# Patient Record
Sex: MALE | Race: WHITE
[De-identification: names, ages, dates, MRNs, and addresses within clinical notes are randomized per-mention and may not be internally consistent; named-entity substitution may affect disease eponyms.]

---

## 2021-01-11 NOTE — NUR
Both nares swabbed for Covid-19 without complication.
Sample taken to [facility].
Patient given pre-procedure preparation self-isolation handout.

## 2021-01-14 ENCOUNTER — HOSPITAL ENCOUNTER (OUTPATIENT)
Dept: HOSPITAL 46 - OPS | Age: 61
Discharge: HOME | End: 2021-01-14
Attending: SURGERY
Payer: COMMERCIAL

## 2021-01-14 VITALS — HEIGHT: 72 IN | BODY MASS INDEX: 35.03 KG/M2 | WEIGHT: 258.6 LBS

## 2021-01-14 DIAGNOSIS — Z79.899: ICD-10-CM

## 2021-01-14 DIAGNOSIS — Z79.4: ICD-10-CM

## 2021-01-14 DIAGNOSIS — K21.9: ICD-10-CM

## 2021-01-14 DIAGNOSIS — F32.9: ICD-10-CM

## 2021-01-14 DIAGNOSIS — K63.89: ICD-10-CM

## 2021-01-14 DIAGNOSIS — D12.0: Primary | ICD-10-CM

## 2021-01-14 DIAGNOSIS — I10: ICD-10-CM

## 2021-01-14 DIAGNOSIS — E78.5: ICD-10-CM

## 2021-01-14 DIAGNOSIS — K64.8: ICD-10-CM

## 2021-01-14 DIAGNOSIS — E11.42: ICD-10-CM

## 2021-01-14 DIAGNOSIS — Z83.71: ICD-10-CM

## 2021-01-14 PROCEDURE — 3E0H8KZ INTRODUCTION OF OTHER DIAGNOSTIC SUBSTANCE INTO LOWER GI, VIA NATURAL OR ARTIFICIAL OPENING ENDOSCOPIC: ICD-10-PCS | Performed by: SURGERY

## 2021-01-14 PROCEDURE — 0DBH8ZZ EXCISION OF CECUM, VIA NATURAL OR ARTIFICIAL OPENING ENDOSCOPIC: ICD-10-PCS | Performed by: SURGERY

## 2021-01-14 PROCEDURE — G0500 MOD SEDAT ENDO SERVICE >5YRS: HCPCS

## 2021-01-14 PROCEDURE — 0DBE8ZZ EXCISION OF LARGE INTESTINE, VIA NATURAL OR ARTIFICIAL OPENING ENDOSCOPIC: ICD-10-PCS | Performed by: SURGERY

## 2021-01-14 NOTE — OR
[facility]
                                    [address], [place]  [address]
_________________________________________________________________________________________
                                                                 Signed   
 
 
DATE OF OPERATION:
01/14/2021
 
SURGEON:
Noah Luna MD
 
PREOPERATIVE DIAGNOSES:
1. Personal history of colonic polyps.
2. Tubulovillous adenomatous polyp on the ileocecal valve.
 
POSTOPERATIVE DIAGNOSES:
1. 8 mm polyp at the ileocecal valve (tattoo).
2. 6 mm polyp at 8 cm.
3. 6 mm polyp at 72 cm.
4. Minimal internal hemorrhoids.
5. Tortuous sigmoid colon.
 
PROCEDURES:
Colonoscopy, snare polypectomy, hot biopsy and injection of tattoo.
 
ESTIMATED BLOOD LOSS:
None.
 
INDICATIONS:
Benedict is a 60-year-old diabetic gentleman, who came to us a few months ago for his
initial colonoscopy.  He said his sister has had previous colonoscopies, but he does not
know much detail.  Benedict himself had hyperplastic and adenomatous polyps removed.  He
had a significant sessile polyp on the ileocecal valve; much of that polyp had been
removed.  The tissue came back tubulovillous adenomatous material.  No dysplasia or
cancer.  We asked him to come back for repeat colonoscopy to reassess that area.  He
understands colonoscopy quite well.  He understands our bowel prep quite well.  He also
had done well with Versed and fentanyl.  He had expressed understanding and wished to
proceed. 
 
DESCRIPTION OF PROCEDURE:
Benedict was taken into our endoscopy suite and placed in the left lateral decubitus
position.  He was given a total of 8 mg of Versed and 150 mcg of fentanyl to cover the
case.  A digital rectal exam was performed and this was unremarkable.  The adult
colonoscope was introduced and advanced under direct visualization of camera.  He does
have a tortuous sigmoid colon; it took a few extra minutes to get through that area.
His prep was slightly below average unfortunately; his first prep was much better;
however, we could clearly see 80% to 85% at least of the mucosa.  The scope then
 
    Electronically Signed By: NOAH LUNA MD  01/14/21 1045
_________________________________________________________________________________________
PATIENT NAME:     BENEDICT WERNER                
MEDICAL RECORD #: [id number]            OPERATIVE REPORT              
          ACCT #: [id number]  
DATE OF BIRTH:   [birth year]            REPORT #: [id number]      
PHYSICIAN:        NOAH LUNA MD             
PCP:              CECILIA BOYER             
REPORT IS CONFIDENTIAL AND NOT TO BE RELEASED WITHOUT AUTHORIZATION
 
 
                                  [facility]
                                    [address], [place]  [address]
_________________________________________________________________________________________
                                                                 Signed   
 
 
advanced quite readily all up to the hepatic flexure; it took just a minute to get
around hepatic flexure and down into the cecum itself.  We could easily see the small
polyp on the edge of the ileal cecal valve; it seemed to extend just a bit down on the
edge as well.  We used the snare and hot biopsy forceps, removed the main portion that
polyp.  We then used cautery along that edge to cauterize that mucosa.  We then placed a
tattoo on either side of the polyp area to roseline its location for future reference.
After this, the scope was then slowly withdrawn.  We had taken pictures throughout for
photodocumentation.  We found two other small polyps removed easily with hot biopsy
forceps.  Again, we came back to the tortuous sigmoid colon.  Once in the rectum, the
scope was retroflexed.  He does have very minimal internal hemorrhoid tissue.  After
this, the gas was suctioned out.  The colonoscope was removed.  Benedict tolerated the
procedure quite well. 
 
RECOMMENDATIONS:
I will see Benedict back in my office in 7 to 14 days to review his results.  He should
consider repeat colonoscopy somewhere between 6 and 12 months.  He probably should use a
double bowel prep in the future. 
 
 
 
            ________________________________________
            Noah Luna MD 
 
 
ALB/MODL
Job #:  [id number]
DD:  01/14/2021 08:24:21
DT:  01/14/2021 10:05:53
 
cc:            Cecilia Luna MD
 
 
Copies:  CECILIA BOYER ANDREW L MD
~
 
 
 
 
 
 
    Electronically Signed By: NOAH LUNA MD  01/14/21 1045
_________________________________________________________________________________________
PATIENT NAME:     BENEDICT WERENR                
MEDICAL RECORD #: [id number]            OPERATIVE REPORT              
          ACCT #: [id number]  
DATE OF BIRTH:   [birth year]            REPORT #: [id number]      
PHYSICIAN:        NOAH LUNA MD             
PCP:              CECILIA BOYER             
REPORT IS CONFIDENTIAL AND NOT TO BE RELEASED WITHOUT AUTHORIZATION

## 2021-01-14 NOTE — NUR
01/14/21 0841 Pro Meek
RESPONDS TO VOICE ON ENTRY TO PACU, DENIES NAUSEA OR PAIN. FALLS
ASLEEP EASILY.
REPOSITIONED IN BED AT 0830 AND SIPS OF WATER TAKEN WITHOUT PROBLEMS.
REFUSES OFFER OF CRACKERS. COFFEE PROVIDED.  MG/DL ON BEDSIDE
FINGERSTICK.
REVIEWING DISCHARGE INSTRUCTIONS WITH PATIENT AT BEDSIDE WHILE PT SIPS
COFFEE.

## 2021-01-18 NOTE — PATH
[facility]
                                    [address], [place]  [address]
_________________________________________________________________________________________
                                                                 Signed   
 
 
 
SPECIMEN(S): A ILEOCECAL VALVE POLYP
SPECIMEN(S): B COLON POLYP AT 80 CM
SPECIMEN(S): C COLON POLYP AT 72 CM
 
SPECIMEN SOURCE:
A. ILEOCECAL VALVE POLYP
B. COLON POLYP AT 80 CM
C. COLON POLYP AT 72 CM
 
CLINICAL HISTORY:
History of polyps.
MICROSCOPIC DESCRIPTION:
Histologic sections of all submitted blocks are examined by light microscopy. 
These findings, together with the gross examination, support the pathologic 
diagnosis. 
 
FINAL PATHOLOGIC DIAGNOSIS:
A.  Ileocecal valve polyp, polypectomy:
-  Fragment of tubular adenoma.
-  Negative for high-grade dysplasia and malignancy.
B.  Colon polyp at 80 cm, polypectomy:
-  Fragments of tubular adenoma.
-  Negative for high-grade dysplasia and malignancy.
C.  Colon polyp at 72 cm, polypectomy:
-  Fragments of tubular adenoma.
-  Negative for high-grade dysplasia and malignancy.
DF:bg:C2NR
 
GROSS DESCRIPTION:
Three specimens are received in three containers, labeled "Benedict Rogers."
A.  The specimen, labeled "Sue Benedict, 1," and designated on the 
requisition "ileocecal valve polyp," is received in formalin and consists of 
four tan soft tissue fragment(s) that measure 
0.2-0.3 cm in greatest dimension. The specimen is entirely submitted in 
cassette (A1). 
B.  The specimen, labeled "Sue Benedict, #2," and designated on the 
requisition "colon polyp at 80 cm," is received in formalin and consists of one 
tan soft tissue fragment(s) that measure 0.3 cm 
in greatest dimension. The specimen is entirely submitted in cassette (B1).
C.  The specimen, labeled "Benedict Rogers, #3," and designated on the 
requisition "colon polyp at 72 cm," is received in formalin and consists of two 
 
                                                                                    
_________________________________________________________________________________________
PATIENT NAME:     BENEDICT ROGERS                
MEDICAL RECORD #: [id number]            PATHOLOGY                     
          ACCT #: [id number]       ACCESSION #: [id number]     
DATE OF BIRTH:   [birth year]            REPORT #: [id number]       
PHYSICIAN:        GAVIN PATHOLOGY              
PCP:              TRAMAINE BOYER             
REPORT IS CONFIDENTIAL AND NOT TO BE RELEASED WITHOUT AUTHORIZATION
 
 
                                  [facility]
                                    [address], [place]  [address]
_________________________________________________________________________________________
                                                                 Signed   
 
 
tan soft tissue fragment(s) that measure 0.3 and 
0.4 cm in greatest dimension. The specimen is entirely submitted in cassette 
(C1). 
FB (under the direct supervision of a pathologist)
 
The Gross Description was prepared using a voice recognition system. The report 
was reviewed for accuracy; however, sound-alike word errors, addition and/or 
deletions may occur. If there is any 
question about this report, please contact Client Services.
 
PERFORMING LABORATORY:
The technical component was performed by [facility], [address] [place] [address] (Medical Director: Steph Nash MD; CLIA# [id number]). 
Professional interpretation was performed by 
[facility], [address] [place] [address] (Medical Director: Steph Nash MD; CLIA# [id number]). 
 
Diagnostician:  Luis Angel Jerez DO
Pathologist
Electronically Signed 01/18/2021
 
 
Copies:                                
~
 
 
 
 
 
 
 
 
 
 
 
 
 
 
 
 
 
 
 
                                                                                    
_________________________________________________________________________________________
PATIENT NAME:     BENEDICT ROGERS                
MEDICAL RECORD #: [id number]            PATHOLOGY                     
          ACCT #: [id number]       ACCESSION #: [id number]     
DATE OF BIRTH:   [birth year]            REPORT #: [id number]       
PHYSICIAN:        GAVIN PATHOLOGY              
PCP:              TRAMAINE BOYER             
REPORT IS CONFIDENTIAL AND NOT TO BE RELEASED WITHOUT AUTHORIZATION

## 2024-08-21 NOTE — NUR
08/21/24 0856 Pricila Champion
0850: OXYGEN SATURATION REMAINS 100% ON 4L VIA NC. OXYGEN REDUCED TO
3L VIA NC.
 
0855: OXYGEN SATURATION REMAINS 100% 3L VIA NC. OXYGEN REDUCED TO 2L
VIA NC.

## 2024-08-22 NOTE — OR
[facility]
                                    [address], [place]  [address]
_________________________________________________________________________________________
                                                                 Signed   
 
 
DATE OF OPERATION:
08/21/2024
 
SURGEON:
Noah Lara MD
 
PREOPERATIVE DIAGNOSES:
1. Gastroesophageal reflux disease.
2. Burping.
3. Irritable bowel syndrome with constipation.
4. Sister with a history of colonic polyps in her 50s.
5. Personal history of colonic polyps in 2020 at age 60.
6. Tubulovillous adenomatous polyp on the ileocecal valve/cecum with tattoo in 2020 and
2021. 
7. Internal hemorrhoids.
8. Tortuous sigmoid colon (35 cm).
 
POSTOPERATIVE DIAGNOSES:
1. Mild gastroduodenitis.
2. A 15 mm sessile polyp on the ileocecal valve (tattoo, snare).
3. Tortuous sigmoid colon (35 cm).
 
PROCEDURES:
1. Esophagogastroduodenoscopy with CLOtest and biopsies of the pyloric bulb and antrum.
2. Colonoscopy snare polypectomy and cautery with a hot biopsy forceps.
 
INDICATIONS:
Benedict is a 63-year-old obese diabetic gentleman, asked to see me for a followup
colonoscopy.  In addition, he has been having trouble with acid reflux and burping.  He
has been on omeprazole.  Consequently, his primary care provider also wanted him to have
an upper endoscopy at the same time.  He has a history of irritable bowel syndrome and
maybe even some constipation.  We also know his sister had colonic polyps in her late
50s.  His sister continues to do well.  I helped Benedict with a colonoscopy in 2020 at
the age of 60.  This was his initial colonoscopy.  He had a fairly significant sessile
tubulovillous adenomatous polyp on the cecum edge of the ileocecal valve.  He had
several other adenomatous polyps in the colon and hyperplastic polyp in the rectum.  He
had mild internal hemorrhoids.  He also has a very difficult tortuous sigmoid colon at
about 35 cm.  He has large amounts of Versed and fentanyl to get through the case.  We
brought him back in January 2021 and repeated the colonoscopy.  We removed the rest of
the polyp on the ileocecal valve, which came back adenomatous tissue.  We also left a
tattoo in this area.  He always has a tortuous sigmoid colon.  He had two other adenomas
polyps as well.  Again, used large amount of Versed and fentanyl.  He also has his
 
    Electronically Signed By: NOAH LARA MD  08/22/24 0627
_________________________________________________________________________________________
PATIENT NAME:     BENEDICT WERNER                
MEDICAL RECORD #: [id number]            OPERATIVE REPORT              
          ACCT #: [id number]  
DATE OF BIRTH:   [birth year]            REPORT #: [id number]      
PHYSICIAN:        NOAH LARA MD             
PCP:              CECILIA BOEYR             
REPORT IS CONFIDENTIAL AND NOT TO BE RELEASED WITHOUT AUTHORIZATION
 
 
                                  [facility]
                                    [address], [place]  [address]
_________________________________________________________________________________________
                                                                 Signed   
 
 
internal hemorrhoids.  We have recommended that he come back on this occasion for
followup colonoscopy.  We also recommend he use a double bowel prep.  He told me he made
it through about 3/4 of a gallon of the polyethylene glycol.  It __________ another 32
ounces.  He did work as a nurse assistant for 12 years, so he is very familiar with
polyethylene glycol along with Dulcolax tablets.  He is very familiar with upper and
lower endoscopy.  He is aware there are risk including, but not limited to gas bloating,
crampy abdominal pain, bleeding, perforation requiring surgery, and missed diagnosis.
We had reviewed the written instructions for the bowel prep again line by line.  He did
volunteer to use a double prep as we recommended.  He seems to be fairly sensitive and
said he started to vomit.  We may have to change his prep in the future.  We had asked
him to actually hold his Trulicity one week prior to the procedure.  He did not do that.
 He did hold the diclofenac.  He said he does not take his diabetic pills till noon, so
he will have his colonoscopy and upper endoscopy completed in the morning prior to this.
 He also has a very full around face.  He is obese with a heavy neck, chest and abdomen
along with his diabetes.  In addition, he uses a very large amount of Versed and
fentanyl.  We felt it was much safer on this occasion.  He was given monitored
anesthesia care, propofol infusion.  That turned out to be wise as he also has a very
large tongue and he obstructs quite easily.  He understands an adult person has to take
him afterwards.  He also received IV Ancef for the metal in his left knee and ankle.  He
had expressed understanding and wished to proceed. 
 
PROCEDURE IN DETAIL:
Benedict was taken into our endoscopy suite and placed in a supine semi-recumbent
position.  He was given monitored anesthesia care propofol infusion per our nurse
anesthetist.  We utilized a bite block for the upper endoscopy.  The adult gastroscope
was introduced and advanced out into the third portion of the duodenum under direct
visualization of camera without difficulty.  The duodenum was unremarkable.  He had some
mild patchy inflammation in the pyloric bulb and stomach.  We went ahead and took a
biopsy of the pyloric bulb and the antrum for pathologic review.  We took an additional
biopsy from the antrum for CLOtest.  There were no ulcerations.  Upon retroflexion of
scope we could not appreciate a hiatal hernia.  The scope was withdrawn up through the
area of the GE junction, which was compliant without stricture.  There was no gastric or
esophageal varices.  Very little if any disruption to the Z-line.  There was no
Shearer's mucosa.  There was no esophagitis.  The middle and upper esophagus was
unremarkable.  After this, the gas was suctioned out, the gastroscope removed.  Benedict
tolerated the procedure quite well. 
 
Benedict was rotated into the left lateral decubitus position.  He was maintained on IV
propofol per our nurse anesthetist.  He needed frequent airway monitoring per the nurse
anesthetist.  A digital rectal exam was performed.  He had good sphincter tone.  No
external hemorrhoids.  No masses.  The adult colonoscope was introduced and advanced
under direct visualization of camera.  He clearly has a tortuous, difficult sigmoid
 
    Electronically Signed By: NOAH LARA MD  08/22/24 0627
_________________________________________________________________________________________
PATIENT NAME:     BENEDICT WERNER                
MEDICAL RECORD #: [id number]            OPERATIVE REPORT              
          ACCT #: [id number]  
DATE OF BIRTH:   [birth year]            REPORT #: [id number]      
PHYSICIAN:        NOAH LARA MD             
PCP:              CECILIA BOYER             
REPORT IS CONFIDENTIAL AND NOT TO BE RELEASED WITHOUT AUTHORIZATION
 
 
                                  [facility]
                                    [address], [place]  [address]
_________________________________________________________________________________________
                                                                 Signed   
 
 
colon  particular up around 35 cm.  It took a few minutes.  We finally got
through this area.  We made it around into the distal right colon.  The scope continued
to buckle even with two people applying abdominal pressure.  We therefore rotated him
into the supine position.  Again, with some abdominal compression we finally got the
scope to drop down into the cecum itself.  Unfortunately, his prep was moderate.  He
still had quite a bit of corn in his cecum.  He also had some in the right colon.  We
could see that the polyp had returned.  There was a tattoo on other side.  It is right
on the edge of the ileocecal valve.  We took almost all that polyp off with our snare
and suctioned it through the scope.  On the back of the fold next to the corn, we were
having trouble with one piece of polyp remaining.  It is probably 8 mm in diameter.
Eventually, we were getting a little bleeding from going a little deep with the snare.
We cauterized that bleeding with our hot biopsy forceps.  We just simply laid it on top
and that controlled the bleeding quite nicely.  We decided we are going to bring him
back with a different bowel prep and go after his remaining polyp tissue.  There was a
polyp in his mid right colon, but again this area was covered in corn at this point and
we could not find it.  Otherwise, the rest of the colon was fine.  No diverticulosis.
The rectum was unremarkable.  He had enough corn in the rectum, we could retroflex the
scope and see his anal canal.  Consequently, the gas was suctioned out.  The colonoscope
removed.  Overall, Benedict tolerated the procedure quite well. 
 
RECOMMENDATIONS:
I will see Benedict back in my office in 7 to 14 days to review his results.  He will
need a colonoscopy somewhere at the end of three months out to about 12 months.  We need
to go back and look at the ileocecal valve and finish the polypectomy.  He needs a full
commercial bowel prep.  He will always need monitored anesthesia care as well. 
 
 
 
            ________________________________________
            MD RHIANNA Nash/DAVIDAL
Job #:  [id number]
DD:  08/21/2024 09:08:14
DT:  08/21/2024 10:08:21
 
cc:            Cecilia Lara MD
 
 
 
    Electronically Signed By: NOAH LARA MD  08/22/24 0627
_________________________________________________________________________________________
PATIENT NAME:     BENEDICT WERNER                
MEDICAL RECORD #: [id number]            OPERATIVE REPORT              
          ACCT #: [id number]  
DATE OF BIRTH:   [birth year]            REPORT #: [id number]      
PHYSICIAN:        NOAH LARA MD             
PCP:              CECILIA BOYER             
REPORT IS CONFIDENTIAL AND NOT TO BE RELEASED WITHOUT AUTHORIZATION
 
 
                                  [address]
                                  [place], [place]  [address]
_________________________________________________________________________________________
                                                                 Signed   
 
 
Copies:  CECILIA BOYER ANDREW L MD
~
 
 
 
 
 
 
 
 
 
 
 
 
 
 
 
 
 
 
 
 
 
 
 
 
 
 
 
 
 
 
 
 
 
 
 
 
 
 
 
 
    Electronically Signed By: NOAH LARA MD  08/22/24 0627
_________________________________________________________________________________________
PATIENT NAME:     BENEDICT WERNER                
MEDICAL RECORD #: [id number]            OPERATIVE REPORT              
          ACCT #: [id number]  
DATE OF BIRTH:   [birth year]            REPORT #: [id number]      
PHYSICIAN:        NOAH LARA MD             
PCP:              CECILIA BOYER             
REPORT IS CONFIDENTIAL AND NOT TO BE RELEASED WITHOUT AUTHORIZATION

## 2024-08-23 NOTE — PATH
[facility]
                                    [address], [place]  [address]
_________________________________________________________________________________________
                                                                 Signed   
 
 
 
SPECIMEN(S): A DUODENAL BIOPSY
SPECIMEN(S): B ANTRUM/PYLORUS BIOPSY
SPECIMEN(S): C CECUM COLON POLYP
 
SPECIMEN SOURCE:
A. DUODENAL BIOPSY
B. ANTRUM/PYLORUS BIOPSY
C. CECUM COLON POLYP
 
CLINICAL HISTORY:
History of polyps.  Postop: Gastroduodenitis, cecal polyp, tortuous sigmoid 
colon 
 
FINAL PATHOLOGIC DIAGNOSIS:
A. Duodenum, biopsy:
-  Duodenal mucosa with no significant pathologic changes
B. Stomach, antrum/pylorus, biopsy:
-  Gastric antral mucosa with no significant pathologic changes
-  Negative for Helicobacter pylori with HE stains
C. Cecum, polypectomy:
-  Sessile serrated polyp with low-grade adenomatous dysplasia
BRP
 
MICROSCOPIC EXAMINATION:
Histologic sections of all submitted blocks are examined by light microscopy.  
These findings, together with the gross examination, support the pathologic 
diagnosis. 
 
GROSS DESCRIPTION:
A. The specimen, labeled and designated "Frovarp, designated per requisition, 
duodenum biopsy," is received in formalin and consists of one fragment of soft 
tan tissue that is up to 0.3 cm in greatest 
dimension. Entirely submitted in (A1).
B. The specimen, labeled and designated "Frovarp, designated per requisition, 
antrum/pylorus biopsy," is received in formalin and consists of one fragment of 
soft tan tissue that is up to 0.3 cm in 
greatest dimension. Entirely submitted in (B1).
C. The specimen, labeled and designated "Frovarp, designated per requisition, 
cecum polyp," is received in formalin and consists of multiple fragments of 
soft tan tissue that are up to 2.5 x 1.5 x 0.3 
cm in greatest dimension. Entirely submitted in (C1).
 
                                                                                    
_________________________________________________________________________________________
PATIENT NAME:     YARY WERNER                
MEDICAL RECORD #: [id number]            PATHOLOGY                     
          ACCT #: [id number]       ACCESSION #: [id number]     
DATE OF BIRTH:   [birth year]            REPORT #: [id number]       
PHYSICIAN:        GAVIN ANDRADE              
PCP:              TRAMAINE BOYER PAC         
REPORT IS CONFIDENTIAL AND NOT TO BE RELEASED WITHOUT AUTHORIZATION
 
 
                                  [facility]
                                    [address], [place]  [address]
_________________________________________________________________________________________
                                                                 Signed   
 
 
TW  (under the direct supervision of a pathologist)
 
The Gross Description was prepared using a voice recognition system. The report 
was reviewed for accuracy; however, sound-alike word errors, addition and/or 
deletions may occur. If there is any 
question about this report, please contact Client Services.
 
ADDITIONAL NOTES:
Immunohistochemical and/or in situ hybridization studies if performed in this 
case included appropriate positive controls that reacted as expected.  This 
test was developed and its performance 
characteristics determined by [facility].  It has not been cleared or 
approved by the U.S. Food and Drug Administration.  The FDA has determined that 
such clearance or approval is not 
necessary.  This test is used for clinical purposes.  It should not be regarded 
as investigational or for research.  [facility] is certified under the 
Clinical Laboratory Improvement 
Amendments of 1988 (CLIA) as qualified to perform high complexity clinical 
laboratory testing. 
 
PERFORMING LABORATORY:
Technical component was performed by [facility], [address] [place] [id number] (CLIA# [id number]). Professional interpretation was 
performed by [facility], 
[address] [address] (CLIA# [id number]).
 
Diagnostician:  Everton Almaraz MD
Pathologist
Electronically Signed 08/23/2024
 
 
Copies:                                
~
 
 
 
 
 
 
 
 
 
 
                                                                                    
_________________________________________________________________________________________
PATIENT NAME:     YARY WERNER                
MEDICAL RECORD #: [id number]            PATHOLOGY                     
          ACCT #: [id number]       ACCESSION #: [id number]     
DATE OF BIRTH:   [birth year]            REPORT #: [id number]       
PHYSICIAN:        G[facility] PATHOLOGY              
PCP:              TRAMAINE BOYER PAC         
REPORT IS CONFIDENTIAL AND NOT TO BE RELEASED WITHOUT AUTHORIZATION

## 2025-01-09 NOTE — OR
[facility]
                                    [address], [place]  [address]
_________________________________________________________________________________________
                                                                 Signed   
 
 
DATE OF OPERATION:
01/09/2025
 
SURGEON:
Noah Lara MD
 
PREOPERATIVE DIAGNOSES:
1. A sister with colonic polyps in her 50s.
2. Chronic constipation associated with diabetes.
3. Personal history of colonic polyps in 2020 at age 60.
4. Internal hemorrhoids.
5. A tortuous sigmoid colon.
6. A tattoo on ileocecal valve/cecum.
 
POSTOPERATIVE DIAGNOSES:
1. Right-sided diverticula x2.
2. Minimal internal hemorrhoids.
3. Tortuous sigmoid colon.
 
PROCEDURE:
Colonoscopy without biopsy.
 
ESTIMATED BLOOD LOSS:
None.
 
INDICATIONS:
Benedict is a 64-year-old obese diabetic gentleman, asked to see me for a followup
colonoscopy.  Interestingly, he was also talking about upper endoscopy __________
scheduled for that today for some reason.  He is not able to tell me why.  There was
some concern about irritable bowel syndrome associated with his constipation and/or his
diabetes.  We know his sister had colonic polyps in her late 50s.  Benedict had a
colonoscopy in 2020 at the age of 60.  This was his initial colonoscopy.  He had a
fairly significant tubulovillous adenomatous polyp in the cecum and on the edge of the
ileocecal valve.  He also had several other adenomatous polyps and hyperplastic polyps
as well.  He had internal hemorrhoids.  He has a very difficult tortuous sigmoid colon
particular up around 35 cm.  He used enormous amounts of Versed and fentanyl.  He came
back a few months later in January 2021 to repeat the colonoscopy.  We removed the rest
of the polyp on the ileocecal valve and it came back adenomatous tissue.  We left a
tattoo.  Again, he has a tortuous sigmoid colon along with his internal hemorrhoids and
we found two other adenomatous polyps.  Again, large amounts of Versed and fentanyl.  We
recommended he use monitored anesthesia care in the future as well as a double bowel
prep.  We had an enormously long discussion in the office on a double bowel prep.  He is
 
    Electronically Signed By: NOAH LARA MD  01/09/25 1100
_________________________________________________________________________________________
PATIENT NAME:     BENEDICT WERNER                
MEDICAL RECORD #: [id number]            OPERATIVE REPORT              
          ACCT #: [id number]  
DATE OF BIRTH:   [birth year]            REPORT #: [id number]      
PHYSICIAN:        NOAH LARA MD             
PCP:              CECILIA BOYER PAC         
REPORT IS CONFIDENTIAL AND NOT TO BE RELEASED WITHOUT AUTHORIZATION
 
 
                                  [facility]
                                    [address] [place] [address]  [id number]
_________________________________________________________________________________________
                                                                 Signed   
 
 
quite convinced, someone in our preop area told him to only use the single bowel prep.
Thankfully, he was fairly clean today.  He returns now for his followup colonoscopy.  He
tells me there is really no change in his bowel habits.  In the office I had given him a
pamphlet on colonoscopy.  He is now very familiar with the test.  There is risk
including, but not limited to gas bloating, crampy abdominal pain, bleeding, perforation
requiring surgery and missed diagnosis.  I also went over the bowel prep with him and
marked it appropriately for double bowel prep.  Yet he only took a single prep.  We
always go over his medications carefully together and I marked that on the preop sheet.
We also gave him Ancef because of his hip replacement.  We also asked for monitored
anesthesia care on this occasion and it did help significantly.  He had expressed
understanding and wished to proceed. 
 
DESCRIPTION OF PROCEDURE:
Benedict was taken into our endoscopy suite, placed in the left lateral decubitus
position.  He was given monitored anesthesia care with propofol infusion per our nurse
anesthetist.  A digital rectal exam was performed and this was unremarkable.  The adult
colonoscope was introduced and advanced under direct visualization of the camera.  It
took very slow careful maneuvering to get the camera up through the sigmoid colon.  It
started to open up more as we went through the left colon and transverse colon.
Unfortunately, the scope continues to drag in the sigmoid colon.  We eventually made it
around his somewhat difficult hepatic flexure and down into the cecum itself with
increased propofol and some abdominal compression.  He had quite a bit of liquid stool
in the cecum.  We irrigated and suctioned that out completely.  We could easily see the
ileocecal valve and his appendiceal orifice.  Interestingly, we never saw a tattoo.  We
looked very carefully in this area and saw no evidence of any recurrent polyp.  As the
scope was withdrawn, I saw a couple of moderate-sized diverticula in the right colon.
There was a small lipoma in the distal transverse colon as well.  We pulled the camera
back down through his very tortuous sigmoid colon into the rectum.  Upon retroflexion of
scope he has minimal internal hemorrhoid columns.  After this, the gas was suctioned
out, colonoscope removed.  Benedict tolerated the procedure quite well. 
 
RECOMMENDATIONS:
Benedict can follow up in three years for a repeat colonoscopy with respect to his
polyps.  He will always need monitored anesthesia care.  He really should consider a
double bowel prep in the future. 
 
 
 
            ________________________________________
            Noah Laar MD 
 
 
 
    Electronically Signed By: NOAH LARA MD  01/09/25 1100
_________________________________________________________________________________________
PATIENT NAME:     BENEDICT WERNER                
MEDICAL RECORD #: [id number]            OPERATIVE REPORT              
          ACCT #: [id number]  
DATE OF BIRTH:   [birth year]            REPORT #: [id number]      
PHYSICIAN:        NOAH LARA MD             
PCP:              CECILIA BOYER PAC         
REPORT IS CONFIDENTIAL AND NOT TO BE RELEASED WITHOUT AUTHORIZATION
 
 
                                  [address] [place]  [address]
_________________________________________________________________________________________
                                                                 Signed   
 
 
ALB/MODL
Job #:  [id number]
DD:  01/09/2025 08:18:03
DT:  01/09/2025 08:47:07
 
cc:            MD Cecilia Nash
 
 
Copies:  NOAH LARA MD, ELIZABETH PAC
~
 
 
 
 
 
 
 
 
 
 
 
 
 
 
 
 
 
 
 
 
 
 
 
 
 
 
 
 
 
 
    Electronically Signed By: NOAH LARA MD  01/09/25 1100
_________________________________________________________________________________________
PATIENT NAME:     BENEDICT WERNER                
MEDICAL RECORD #: [id number]            OPERATIVE REPORT              
          ACCT #: [id number]  
DATE OF BIRTH:   [birth year]            REPORT #: [id number]      
PHYSICIAN:        NOAH LARA MD             
PCP:              CECILIA BOYER PAC         
REPORT IS CONFIDENTIAL AND NOT TO BE RELEASED WITHOUT AUTHORIZATION

## 2025-01-09 NOTE — NUR
01/09/25 0834 Fern Nichols
0808- PT ARRIVES TO THE PACU WITH AN ORAL AIRWAY IN PLACE AND ON 2L OF
O2 VIA NASAL CANNULA. BREATHING IS EVEN AND UNLABORED. PT VSS. ABDOMEN
IS SOFT AND NON DISTENDED AND LAYING ON HIS LEFT SIDE. LR INFUSING IN
HIS R FOREARM. PT IS NONREACTIVE TO TACTILE STIMULI.
 
0817- PT IS MOVING HIS ARMS OFF AND ON AND BEGINS TO COUGH. ORAL
AIRWAY REMOVED. PT ENCOURAGED TO PASS GAS.
PT DENIES PAIN AND NAUSEA WHEN ASKED AND EASILY FALLS
BACK TO SLEEP WITH SOME SNORING NOTED.
 
0831- O2 TURNED OFF AND REMOVED AS PT IS ABLE TO MAINTAIN ABOVE 98%.
PT PASSING GAS OFF AND ON. NO APPARENT DISTRESS AND EASILY FALLS BACK
TO SLEEP BUT IS EASILY REACTIVE TO VERBAL STIMULI.